# Patient Record
(demographics unavailable — no encounter records)

---

## 2025-04-02 NOTE — HISTORY OF PRESENT ILLNESS
[de-identified] : 4/2/25: HPI obtained from patient's parent as independent historian due to patient's age making them an unreliable historian. 7yo female (RHD) presents with mother for RIGHT small finger pain and swelling after a fall on 4/1/25. [FreeTextEntry5] : ANURAG aguirre [RHD] 8 year old female is here today for evaluation of RIGHT small finger pain and swelling after falling on 04/01/25.

## 2025-04-02 NOTE — IMAGING
[de-identified] : RIGHT HAND skin intact. mild swelling of SF. TTP to SF middle phalanx. SF: good extension, flex to half-fist. no scissoring. good EPL, FPL. other fingers good extension, flex to full fist. good finger abduction, adduction. SILT to median, ulnar, radial distribution. palpable radial pulse, brisk cap refill all digits. no triggering.  @4/2/25 XRAYS OF RIGHT SMALL FINGER (3 views - PA, OBLIQUE, AND LATERAL VIEWS): small finger minimally displaced middle phalanx SH II fx. open physes.

## 2025-04-02 NOTE — ASSESSMENT
[FreeTextEntry1] : The condition was explained to the patient and her mother.  Fracture alignment within acceptable limits. Plan to proceed with non-operative treatment. We discussed the benefit of carmita strapping and early range of motion. The patient may benefit from therapy. Risks of treatment include, but are not limited to persistent pain, stiffness, fracture displacement, physeal arrest, need for future surgery, mal-union, non-union.   - I personally applied carmita straps to SF/RF, full time except hygiene.  Reviewed application of strap - slide only one finger through loop, then strap to adjacent finger, tight enough that the strap does not slide off, but not so tight that it causes indentation or discoloration. patient expressed understanding. - encouraged HEP (making a fist) to reduce finger stiffness. - light activity. no gym/sports.   F/u 3 weeks. X-rays of R small finger.

## 2025-04-23 NOTE — IMAGING
[de-identified] : RIGHT HAND skin intact. mild swelling of SF. non-TTP to SF middle phalanx. SF: good extension, flex to full fist. no scissoring. good EPL, FPL. other fingers good extension, flex to full fist. good finger abduction, adduction. SILT to median, ulnar, radial distribution. palpable radial pulse, brisk cap refill all digits. no triggering.  @4/2/25 XRAYS OF RIGHT SMALL FINGER (3 views - PA, OBLIQUE, AND LATERAL VIEWS): small finger minimally displaced middle phalanx SH II fx. open physes.

## 2025-04-23 NOTE — HISTORY OF PRESENT ILLNESS
[de-identified] : 4/23/25: presents with mother 3 weeks s/p RIGHT small finger middle phalanx SH II fx from 4/1/25. stopped carmita straps. pain better, flared when playing with her cousins who twisted her finger, better now.  4/2/25: HPI obtained from patient's parent as independent historian due to patient's age making them an unreliable historian. 7yo female (RHD) presents with mother for RIGHT small finger pain and swelling after a fall on 4/1/25. [FreeTextEntry5] : ANURAG aguirre [RHD] 8 year old female is following up on right small finger. Pt states she's feeling better since last visit.